# Patient Record
Sex: MALE | Race: WHITE
[De-identification: names, ages, dates, MRNs, and addresses within clinical notes are randomized per-mention and may not be internally consistent; named-entity substitution may affect disease eponyms.]

---

## 2018-01-31 ENCOUNTER — HOSPITAL ENCOUNTER (OUTPATIENT)
Dept: HOSPITAL 41 - JD.ED | Age: 23
Discharge: HOME | End: 2018-01-31
Attending: SURGERY
Payer: COMMERCIAL

## 2018-01-31 DIAGNOSIS — K42.0: Primary | ICD-10-CM

## 2018-01-31 DIAGNOSIS — L02.216: ICD-10-CM

## 2018-01-31 DIAGNOSIS — Z90.89: ICD-10-CM

## 2018-01-31 PROCEDURE — 96360 HYDRATION IV INFUSION INIT: CPT

## 2018-01-31 PROCEDURE — 74177 CT ABD & PELVIS W/CONTRAST: CPT

## 2018-01-31 PROCEDURE — 87205 SMEAR GRAM STAIN: CPT

## 2018-01-31 PROCEDURE — 86140 C-REACTIVE PROTEIN: CPT

## 2018-01-31 PROCEDURE — 85025 COMPLETE CBC W/AUTO DIFF WBC: CPT

## 2018-01-31 PROCEDURE — 96361 HYDRATE IV INFUSION ADD-ON: CPT

## 2018-01-31 PROCEDURE — 10060 I&D ABSCESS SIMPLE/SINGLE: CPT

## 2018-01-31 PROCEDURE — 82150 ASSAY OF AMYLASE: CPT

## 2018-01-31 PROCEDURE — 99285 EMERGENCY DEPT VISIT HI MDM: CPT

## 2018-01-31 PROCEDURE — 49585: CPT

## 2018-01-31 PROCEDURE — 87075 CULTR BACTERIA EXCEPT BLOOD: CPT

## 2018-01-31 PROCEDURE — 36415 COLL VENOUS BLD VENIPUNCTURE: CPT

## 2018-01-31 PROCEDURE — 80053 COMPREHEN METABOLIC PANEL: CPT

## 2018-01-31 PROCEDURE — 83605 ASSAY OF LACTIC ACID: CPT

## 2018-01-31 RX ADMIN — Medication PRN ML: at 10:29

## 2018-01-31 RX ADMIN — Medication PRN ML: at 11:17

## 2018-01-31 NOTE — PCM.POSTAN
POST ANESTHESIA ASSESSMENT





- MENTAL STATUS


Mental Status: Alert, Oriented





- VITAL SIGNS


Pulse Rate: 102


SaO2: 98


Resp Rate: 17


Blood Pressure: 120/79


Temperature: 36.9 C





- RESPIRATORY


Respiratory Status: Respiratory Rate WNL, Airway Patent, O2 Saturation Stable, 

Supplemental Oxygen





- CARDIOVASCULAR


CV Status: Pulse Rate WNL, Blood Pressure Stable





- GASTROINTESTINAL


GI Status: No Symptoms





- PAIN


Pain Score: 0





- POST OP HYDRATION


Hydration Status: Adequate & Stable

## 2018-01-31 NOTE — PCM.HP
H&P History of Present Illness





- General


Date of Service: 01/31/18


Admit Problem/Dx: 


 Admission Diagnosis/Problem





Admission Diagnosis/Problem      Hernia repair








Source of Information: Patient


History Limitations: Reports: No Limitations





- History of Present Illness


Initial Comments - Free Text/Narative: 





23-year-old male presents with a 2-3 day history of persistent umbilical pain. 

The pain was worse with activity and increasingly uncomfortable. He presented 

to the emergency room earlier this morning for evaluation. A CT scan of his 

abdomen was performed and confirmed the presence of an incarcerated umbilical 

hernia containing fat. I was asked to see him for surgical consultation. He 

last ate around 5:30 this morning.


  ** Middle Abdomen


Pain Score (Numeric/FACES): 5





- Related Data


Allergies/Adverse Reactions: 


 Allergies











Allergy/AdvReac Type Severity Reaction Status Date / Time


 


No Known Allergies Allergy   Verified 01/31/18 09:17











Home Medications: 


 Home Meds





. [No Known Home Meds]  01/31/18 [History]











Past Medical History





- Past Health History


Medical/Surgical History: Denies Medical/Surgical History





- Past Surgical History


HEENT Surgical History: Reports: Tonsillectomy





Social & Family History





- Tobacco Use


Smoking Status *Q: Never Smoker





- Living Situation & Occupation


Living situation: Reports: Single


Occupation: Employed (Currently working as an   apprentice.)





H&P Review of Systems





- Review of Systems:


Review Of Systems: See Below





Exam





- Exam


Exam: See Below





- Vital Signs


Vital Signs: 


 Last Vital Signs











Temp  36.9 C   01/31/18 09:20


 


Pulse  78   01/31/18 09:20


 


Resp  18   01/31/18 09:20


 


BP  137/77   01/31/18 09:20


 


Pulse Ox  100   01/31/18 09:20











Weight: 115.666 kg





- Exam


General: Alert, Oriented, Cooperative


HEENT: EOMI, Hearing Intact


Neck: Supple, Trachea Midline


Lungs: Normal Respiratory Effort


Cardiovascular: Regular Rate, Regular Rhythm


GI/Abdominal Exam: Hernia (Umbilical erythema and tenderness to palpation with 

a palpable indurated herniated portion of fat)


 (Male) Exam: Deferred


Rectal (Males) Exam: Deferred


Extremities: Normal Inspection


Neuro Extensive - Mental Status: Alert, Oriented x3


Psychiatric: Alert, Normal Affect





- Patient Data


Lab Results Last 24 hrs: 


 Laboratory Results - last 24 hr











  01/31/18 01/31/18 01/31/18 Range/Units





  10:26 10:26 10:26 


 


WBC  7.70    (4.23-9.07)  K/mm3


 


RBC  5.99    (4.63-6.08)  M/mm3


 


Hgb  16.6    (13.7-17.5)  gm/L


 


Hct  48.8    (40.1-51.0)  %


 


MCV  81.5    (79.0-92.2)  fl


 


MCH  27.7    (25.7-32.2)  pg


 


MCHC  34.0    (32.2-35.5)  g/dl


 


RDW Std Deviation  39.9    (35.1-43.9)  fL


 


Plt Count  260    (163-337)  K/mm3


 


MPV  9.5    (9.4-12.3)  fl


 


Neutrophils % (Manual)  55    (40-60)  %


 


Band Neutrophils %  0    (0-10)  %


 


Lymphocytes % (Manual)  40    (20-40)  %


 


Atypical Lymphs %  0    %


 


Monocytes % (Manual)  4    (2-10)  %


 


Eosinophils % (Manual)  1    (0.8-7.0)  %


 


Basophils % (Manual)  0 L    (0.2-1.2)  


 


Platelet Estimate  Adequate    


 


RBC Morph Comment  Normal    


 


Sodium   140   (136-145)  mEq/L


 


Potassium   4.3   (3.5-5.1)  mEq/L


 


Chloride   103   ()  mEq/L


 


Carbon Dioxide   28   (21-32)  mEq/L


 


Anion Gap   13.3   (5-15)  


 


BUN   22 H   (7-18)  mg/dL


 


Creatinine   1.0   (0.7-1.3)  mg/dL


 


Est Cr Clr Drug Dosing   120.27   mL/min


 


Estimated GFR (MDRD)   > 60   (>60)  mL/min


 


BUN/Creatinine Ratio   22.0 H   (14-18)  


 


Glucose   98   ()  mg/dL


 


Lactic Acid    1.3  (0.4-2.0)  mmol/L


 


Calcium   9.4   (8.5-10.1)  mg/dL


 


Total Bilirubin   0.6   (0.2-1.0)  mg/dL


 


AST   22   (15-37)  U/L


 


ALT   49   (16-63)  U/L


 


Alkaline Phosphatase   57   ()  U/L


 


C-Reactive Protein   < 0.2   (<1.0)  mg/dL


 


Total Protein   8.0   (6.4-8.2)  g/dl


 


Albumin   4.4   (3.4-5.0)  g/dl


 


Globulin   3.6   gm/dL


 


Albumin/Globulin Ratio   1.2   (1-2)  


 


Amylase   42   ()  U/L











Result Diagrams: 


 01/31/18 10:26





 01/31/18 10:26





*Q Meaningful Use (ADM)





- VTE *Q


VTE Criteria *Q: 








- Stroke *Q


Stroke Criteria *Q: 








- AMI *Q


AMI Criteria *Q: 








- Problem List


(1) Incarcerated umbilical hernia


SNOMED Code(s): 884726556


   ICD Code: K42.0 - UMBILICAL HERNIA WITH OBSTRUCTION, WITHOUT GANGRENE   

Status: Acute   Priority: High   Current Visit: Yes   Onset Date: ~01/28/18   


Problem List Initiated/Reviewed/Updated: Yes


Orders Last 24hrs: 


 Active Orders 24 hr











 Category Date Time Status


 


 Admission Status [Patient Status] [ADT] Routine ADT  01/31/18 12:16 Active


 


 Patient to Empty Bladder [RC] ASDIRECTED Care  01/31/18 12:19 Ordered


 


 Verify Patient Consent Obtain [RC] ASDIRECTED Care  01/31/18 12:19 Ordered


 


 Nothing Per Oral Diet [DIET] Diet  01/31/18 Lunch Ordered


 


 Sodium Chloride 0.9% @ 125 MLS/HR (1000ml) Med  01/31/18 12:30 Ordered





 Sodium Chloride 0.9% [Normal Saline] 1,000 ml   





 IV ASDIRECTED   


 


 Sodium Chloride 0.9% [Normal Saline] 1,000 ml Med  01/31/18 10:00 Active





 IV ASDIRECTED   


 


 Sodium Chloride 0.9% [Saline Flush] Med  01/31/18 09:56 Active





 10 ml FLUSH ONETIME PRN   


 


 cefOXitin [Mefoxin in Dextrose,Iso-Osm 1 GM/50 ML] 1 gm Med  01/31/18 12:19 

Ordered





 Premix Bag 1 bag   





 IV ONETIME   


 


 Schedule Procedure [COMM] Stat Oth  01/31/18 12:16 Ordered


 


 Resuscitation Status Routine Resus Stat  01/31/18 12:19 Ordered








 Medication Orders





Sodium Chloride (Normal Saline)  1,000 mls @ 125 mls/hr IV ASDIRECTED VAN


   Last Admin: 01/31/18 10:21  Dose: 125 mls/hr


Cefoxitin Sodium 1 gm/ Premix  50 mls @ 100 mls/hr IV ONETIME ONE


   Stop: 01/31/18 12:48


Sodium Chloride (Normal Saline)  1,000 mls @ 125 mls/hr IV ASDIRECTED [facility]


Sodium Chloride (Saline Flush)  10 ml FLUSH ONETIME PRN


   PRN Reason: IV FLUSH


   Last Admin: 01/31/18 11:17  Dose: 10 ml


   Admin: 01/31/18 10:29  Dose: 10 ml








Assessment/Plan Comment:: 





Incarcerated umbilical hernia with ischemia. He needs open reduction and repair 

as soon as possible.





I explained to the patient that it was highly unlikely that mesh would be 

needed. We also discussed bleeding infection and recurrence. He was happy to 

proceed after having all of his questions answered.

## 2018-01-31 NOTE — CT
CT abdomen and pelvis

 

Technique: Multiple axial sections were obtained from above the dome 

of the diaphragm inferiorly through the pubic symphysis.  Intravenous 

and oral contrast was given.

 

Comparison: Prior abdominal x-ray of 06/23/12.

 

Findings: Small portion of the visualized lung bases are clear.  Liver

 shows no focal parenchymal abnormality.  Spleen appears within normal

 limits.  Adrenal glands show no nodule.  Pancreas is within normal 

limits.  Gallbladder contains no calcified gallstones.  Kidneys show 

no hydronephrosis or mass.  Aorta shows no aneurysmal dilatation.  

Gallbladder contains no calcified gallstones.  No retroperitoneal 

adenopathy or mesenteric abnormalities are seen.  

 

Small low-density abnormality is noted at the base of the umbilicus.  

Uncertain as to etiology but difficult to exclude a small abscess or 

hematoma.  There is a small fat-containing umbilical hernia also being

 seen.  This low-density finding measures about 1.8 cm.

 

No mesenteric abnormalities are seen.  No pelvic mass or adenopathy is

 noted.  No free fluid or inflammatory change is seen within the 

abdomen or within the pelvis.

 

Bone window settings were reviewed which appears within normal limits 

for the patient's age.

 

Impression:

1.  Small fat-containing umbilical hernia.  Low-density abnormality at

 the base of the umbilicus which is of uncertain etiology but 

difficult to exclude abscess if patient has any infectious symptoms.  

Umbilical hematoma is also within the differential.

2.  No additional abnormality is seen on CT study of the abdomen and 

pelvis.

 

Diagnostic code #3

## 2018-01-31 NOTE — EDM.PDOC
ED HPI GENERAL MEDICAL PROBLEM





- General


Chief Complaint: Abdominal Pain


Stated Complaint: HERNIA SENT BY [facility]


Time Seen by Provider: 01/31/18 09:45


Source of Information: Reports: Patient


History Limitations: Reports: No Limitations





- History of Present Illness


INITIAL COMMENTS - FREE TEXT/NARRATIVE: 





23-year-old male presents presents to the ED with persistent abdominal pain 

that started 3 days ago. States is getting worse. He was seen to the walk-in 

clinic at [facility] last evening and an ultrasound of his umbilicus was 

performed. Addended identified tissue incarcerated within an umbilical hernia. 

 is no previous abdominal surgeries. He was thus sent to the ED for 

further evaluation of possible incarcerated tissue in the hernia. He has no 

nausea or vomiting. States him putting this morning only. Bowel function has 

been normal without any blood. He is otherwise in good health.


Onset: Gradual


Onset Date: 01/29/18


Duration: Hour(s):


Location: Reports: Abdomen (Umbilicus superior aspect)


Quality: Reports: Ache, Sharp, Stabbing, Other


Severity: Moderate (Worsens when he stands fully erect. Worsens with coughing.)


Improves with: Reports: Rest


Worsens with: Reports: Movement (Even at rest he still aware of discomfort at 

the umbilicus.)


Context: Denies: Activity, Exercise ( Especially stretching or standing fully 

erect or coughing.), Lifting, Sick Contact, Trauma, Other


Associated Symptoms: Denies: No Other Symptoms, Chest Pain, Cough, cough w 

sputum, Diaphoresis, Fever/Chills, Headaches, Loss of Appetite, Malaise, Nausea/

Vomiting, Rash, Seizure, Shortness of Breath, Syncope


Treatments PTA: Reports: Other (see below)


  ** Middle Abdomen


Pain Score (Numeric/FACES): 5





- Related Data


 Allergies











Allergy/AdvReac Type Severity Reaction Status Date / Time


 


No Known Allergies Allergy   Verified 01/31/18 09:17











Home Meds: 


 Home Meds





. [No Known Home Meds]  01/31/18 [History]











Past Medical History





- Past Health History


Medical/Surgical History: Denies Medical/Surgical History





- Past Surgical History


HEENT Surgical History: Reports: Tonsillectomy





Social & Family History





- Tobacco Use


Smoking Status *Q: Never Smoker





- Living Situation & Occupation


Living situation: Reports: Single


Occupation: Employed (Currently working as an   apprentice.)





ED ROS GENERAL





- Review of Systems


Review Of Systems: See Below


Constitutional: Denies: Fever, Chills, Malaise, Weakness, Night Sweats, 

Diaphoresis, Decreased Appetite, Weight Loss, Weight Gain


HEENT: Reports: No Symptoms


Respiratory: Reports: No Symptoms


Cardiovascular: Reports: No Symptoms


Endocrine: Reports: No Symptoms


GI/Abdominal: Reports: Abdominal Pain (See history of present illness.).  Denies

: Bloody Stool, Hematemesis, Hematochezia, Nausea, Stool Incontinence, Vomiting


: Reports: No Symptoms


Musculoskeletal: Reports: No Symptoms


Skin: Reports: No Symptoms


Neurological: Reports: No Symptoms


Psychiatric: Reports: No Symptoms


Hematologic/Lymphatic: Reports: No Symptoms


Immunologic: Reports: No Symptoms





ED EXAM, GI/ABD





- Physical Exam


Exam: See Below


Exam Limited By: No Limitations


General Appearance: Alert, WD/WN, No Apparent Distress


Eyes: Bilateral: Normal Appearance


Head: Atraumatic, Normocephalic


Neck: Normal Inspection, Supple, Non-Tender, Full Range of Motion.  No: 

Lymphadenopathy (L), Lymphadenopathy (R)


Respiratory/Chest: No Respiratory Distress, Lungs Clear, Normal Breath Sounds


Cardiovascular: Normal Peripheral Pulses, Regular Rate, Rhythm, No Edema, No 

Murmur


GI/Abdominal Exam: Normal Bowel Sounds, Soft, Tender (Tenderness to the 

superior aspect of the umbilicus. No palpable mass but painful to cough.)


 (Male) Exam: Hernia (Appears to have a umbilical hernia superiorly.)


Back Exam: Normal Inspection, Full Range of Motion.  No: CVA Tenderness (L), 

CVA Tenderness (R)


Extremities: Normal Inspection, Normal Range of Motion, Non-Tender, No Pedal 

Edema


Neurological: Alert, Oriented, CN II-XII Intact, Normal Cognition


Psychiatric: Normal Affect, Normal Mood


Skin Exam: Warm, Dry, Intact, Normal Color





Course





- Vital Signs


Last Recorded V/S: 


 Last Vital Signs











Temp  36.9 C   01/31/18 09:20


 


Pulse  78   01/31/18 09:20


 


Resp  18   01/31/18 09:20


 


BP  137/77   01/31/18 09:20


 


Pulse Ox  100   01/31/18 09:20














- Orders/Labs/Meds


Orders: 


 Active Orders 24 hr











 Category Date Time Status


 


 Admission Status [Patient Status] [ADT] Routine ADT  01/31/18 12:16 Active


 


 Patient to Empty Bladder [RC] ASDIRECTED Care  01/31/18 12:19 Active


 


 Verify Patient Consent Obtain [RC] ASDIRECTED Care  01/31/18 12:19 Active


 


 Nothing Per Oral Diet [DIET] Diet  01/31/18 Lunch Active


 


 Sodium Chloride 0.9% [Normal Saline] 1,000 ml Med  01/31/18 10:00 Active





 IV ASDIRECTED   


 


 Sodium Chloride 0.9% [Normal Saline] 1,000 ml Med  01/31/18 12:30 Active





 IV ASDIRECTED   


 


 Sodium Chloride 0.9% [Saline Flush] Med  01/31/18 09:56 Active





 10 ml FLUSH ONETIME PRN   


 


 cefOXitin [Mefoxin in Dextrose,Iso-Osm 1 GM/50 ML] 1 gm Med  01/31/18 12:19 

Active





 Premix Bag 1 bag   





 IV ONETIME   


 


 Schedule Procedure [COMM] Stat Oth  01/31/18 12:16 Ordered


 


 Resuscitation Status Routine Resus Stat  01/31/18 12:19 Ordered








 Medication Orders





Sodium Chloride (Normal Saline)  1,000 mls @ 125 mls/hr IV ASDIRECTED VAN


   Last Admin: 01/31/18 10:21  Dose: 125 mls/hr


Cefoxitin Sodium 1 gm/ Premix  50 mls @ 100 mls/hr IV ONETIME ONE


   Stop: 01/31/18 12:48


Sodium Chloride (Normal Saline)  1,000 mls @ 125 mls/hr IV ASDIRECTED VAN


Sodium Chloride (Saline Flush)  10 ml FLUSH ONETIME PRN


   PRN Reason: IV FLUSH


   Last Admin: 01/31/18 11:17  Dose: 10 ml


   Admin: 01/31/18 10:29  Dose: 10 ml








Labs: 


 Laboratory Tests











  01/31/18 01/31/18 01/31/18 Range/Units





  10:26 10:26 10:26 


 


WBC  7.70    (4.23-9.07)  K/mm3


 


RBC  5.99    (4.63-6.08)  M/mm3


 


Hgb  16.6    (13.7-17.5)  gm/L


 


Hct  48.8    (40.1-51.0)  %


 


MCV  81.5    (79.0-92.2)  fl


 


MCH  27.7    (25.7-32.2)  pg


 


MCHC  34.0    (32.2-35.5)  g/dl


 


RDW Std Deviation  39.9    (35.1-43.9)  fL


 


Plt Count  260    (163-337)  K/mm3


 


MPV  9.5    (9.4-12.3)  fl


 


Neutrophils % (Manual)  55    (40-60)  %


 


Band Neutrophils %  0    (0-10)  %


 


Lymphocytes % (Manual)  40    (20-40)  %


 


Atypical Lymphs %  0    %


 


Monocytes % (Manual)  4    (2-10)  %


 


Eosinophils % (Manual)  1    (0.8-7.0)  %


 


Basophils % (Manual)  0 L    (0.2-1.2)  


 


Platelet Estimate  Adequate    


 


RBC Morph Comment  Normal    


 


Sodium   140   (136-145)  mEq/L


 


Potassium   4.3   (3.5-5.1)  mEq/L


 


Chloride   103   ()  mEq/L


 


Carbon Dioxide   28   (21-32)  mEq/L


 


Anion Gap   13.3   (5-15)  


 


BUN   22 H   (7-18)  mg/dL


 


Creatinine   1.0   (0.7-1.3)  mg/dL


 


Est Cr Clr Drug Dosing   120.27   mL/min


 


Estimated GFR (MDRD)   > 60   (>60)  mL/min


 


BUN/Creatinine Ratio   22.0 H   (14-18)  


 


Glucose   98   ()  mg/dL


 


Lactic Acid    1.3  (0.4-2.0)  mmol/L


 


Calcium   9.4   (8.5-10.1)  mg/dL


 


Total Bilirubin   0.6   (0.2-1.0)  mg/dL


 


AST   22   (15-37)  U/L


 


ALT   49   (16-63)  U/L


 


Alkaline Phosphatase   57   ()  U/L


 


C-Reactive Protein   < 0.2   (<1.0)  mg/dL


 


Total Protein   8.0   (6.4-8.2)  g/dl


 


Albumin   4.4   (3.4-5.0)  g/dl


 


Globulin   3.6   gm/dL


 


Albumin/Globulin Ratio   1.2   (1-2)  


 


Amylase   42   ()  U/L











Meds: 


Medications











Generic Name Dose Route Start Last Admin





  Trade Name Freq  PRN Reason Stop Dose Admin


 


Sodium Chloride  1,000 mls @ 125 mls/hr  01/31/18 10:00  01/31/18 10:21





  Normal Saline  IV   125 mls/hr





  ASDIRECTED VAN   Administration


 


Cefoxitin Sodium 1 gm/ Premix  50 mls @ 100 mls/hr  01/31/18 12:19  





  IV  01/31/18 12:48  





  ONETIME ONE   


 


Sodium Chloride  1,000 mls @ 125 mls/hr  01/31/18 12:30  





  Normal Saline  IV   





  ASDIRECTED VAN   


 


Sodium Chloride  10 ml  01/31/18 09:56  01/31/18 11:17





  Saline Flush  FLUSH   10 ml





  ONETIME PRN   Administration





  IV FLUSH   














Discontinued Medications














Generic Name Dose Route Start Last Admin





  Trade Name Jose  PRN Reason Stop Dose Admin


 


Diatrizoate Meglum/Diatrizoate Sod  90 ml  01/31/18 09:56  01/31/18 11:17





  Gastrografin 37%  PO  01/31/18 09:57  90 ml





  ONETIME ONE   Administration


 


Iopamidol  150 ml  01/31/18 09:56  01/31/18 11:17





  Isovue-300 (61%)  IVPUSH  01/31/18 09:57  125 ml





  ONETIME ONE   Administration














- Radiology Interpretation


Free Text/Narrative:: 





23-year-old male presents to the ED with persistent umbilical pain for the last 

2 and half days. Hurts to cough hurts to stretch in certain movements cause 

pain. He had ultrasound done to the [facility] walk-in side last night and 

it revealed tissue incarcerated within a umbilical hernia. He sent to the ED 

for further evaluation this morning as to whether not this could be bowel 

within his abdominal wall. He is very tender in the superior aspect of his 

umbilicus. No large masses appreciated. His appetite is good and his bowel 

function is normal. Offered him pain medicine he felt he did not need any at 

this time. Plan will be CT of the abdomen and pelvis with oral and IV contrast 

to identify what may be incarcerated in umbilical hernia. Routine preoperative 

labs done just in case he needs to go to the OR.





- Re-Assessments/Exams


Free Text/Narrative Re-Assessment/Exam: 





01/31/18 11:45: CT of the abdomen can reveals a fatty incarcerated umbilical 

hernia with surrounding inflammatory fluid. This suggests necrosis of fatty 

tissue. This would be the reason that the pain is actually worsening and set of 

improving. I will therefore call surgeon Dr. Gentile whom is on call. Patient 

is willing to have surgery if deemed necessary. Dr. Gentile is currently in 

surgery but will see the patient once he is out of the OR.





01/31/18 12:14 Dr. Gentile came by and will see the patient in the the ED in 

consultation.White count is 7.70 with a 55% neutrophils and 0% band cells. 

Hemoglobin is 16.6 with hematocrit of 40.8. Chemistry is all normal. BUN is 

slightly elevated at 22. Glucose is 98. Lactic acid is 1.3. Amylase is 42. C-

reactive protein is less than 0.2. Liver function is normal.





01/31/18 12:26 Dr. Gentile plans on taking him to the OR shortly.








Departure





- Departure


Time of Disposition: 12:25


Disposition: DC/Tfer to [facility]


Condition: Fair


Clinical Impression: 


 Incarcerated umbilical hernia








- Discharge Information


Referrals: 


PCP,None [Primary Care Provider] - 


Forms:  ED Department Discharge





- My Orders


Last 24 Hours: 


My Active Orders





01/31/18 09:56


Sodium Chloride 0.9% [Saline Flush]   10 ml FLUSH ONETIME PRN 





01/31/18 10:00


Sodium Chloride 0.9% [Normal Saline] 1,000 ml IV ASDIRECTED 














- Assessment/Plan


Last 24 Hours: 


My Active Orders





01/31/18 09:56


Sodium Chloride 0.9% [Saline Flush]   10 ml FLUSH ONETIME PRN 





01/31/18 10:00


Sodium Chloride 0.9% [Normal Saline] 1,000 ml IV ASDIRECTED

## 2018-01-31 NOTE — PCM.OPNOTE
- General Post-Op/Procedure Note


Date of Surgery/Procedure: 01/31/18


Operative Procedure(s): 1. Incision and drainage of a sebaceous umbilical cyst.

  2. Umbilical hernia repair primary closure


Findings: 





1. 3 mL of yellow pus emanating from a skin cyst at the base of the umbilical 

skin.


2. 8mm defect containing herniated preperitoneal fat which was not ischemic or 

compromised in any way.


Pre Op Diagnosis: Incarcerated umbilical hernia


Post-Op Diagnosis: 1. Cutaneous umbilical abscess.  2. Small umbilical hernia


Anesthesia Technique: General ET Tube, Local


Primary Surgeon: Wilfrido Gentile


Pathology: 





Cultures


EBL in mLs: 2


Complications: None


Condition: Good


Free Text/Narrative:: 





After adequate general endotracheal tube anesthesia was obtained the patient's 

abdomen was prepped and draped for an open umbilical hernia repair. During the 

prep process several cc of fabiola yellow pus flowed from the base of the 

umbilicus. Aerobic and anaerobic cultures were performed at this point. Local 

analgesia was given just above the umbilicus. A 15 blade was used to make a 

small curvilinear incision. The umbilical hernia was dissected away from the 

surrounding subcutaneous tissue with scissors. I  the umbilical skin 

away from the very small umbilical hernia. The rest of the pus was aspirated. I 

irrigated out the entire field with 500 mL of saline. I cauterized the base of 

the umbilical skin then closed the opening with a 3-0 Vicryl. The small 

umbilical herniation which was most likely an incidental finding was reduced 

and I closed the very small defect with a figure-of-eight 0 Prolene. The 

umbilicus was attached to the midline fascia with a 3-0 Vicryl figure-of-eight. 

A quarter-inch Penrose drain was placed from the base of the umbilical skin and 

secured to the abdominal skin with a 2-0 Prolene. 3-0 Vicryl was used to 

reapproximate the umbilicus. Staples were used to close the skin. Gauze and 

tape were used for the dressing. There were no complications.

## 2018-01-31 NOTE — PCM.PREANE
Preanesthetic Assessment





- Anesthesia/Transfusion/Family Hx


Anesthesia History: Prior Anesthesia Without Reaction


Family History of Anesthesia Reaction: No


Transfusion History: No Prior Transfusion(s)





- Review of Systems


General: No Symptoms


Pulmonary: No Symptoms


Cardiovascular: No Symptoms


Gastrointestinal: Abdominal Pain


Neurological: No Symptoms


Other: Reports: None





- Physical Assessment


NPO Status Date: 01/31/18


NPO Status Time: 05:45


O2 Sat by Pulse Oximetry: 100


Respiratory Rate: 18


Vital Signs: 





 Last Vital Signs











Temp  36.9 C   01/31/18 09:20


 


Pulse  78   01/31/18 09:20


 


Resp  18   01/31/18 09:20


 


BP  137/77   01/31/18 09:20


 


Pulse Ox  100   01/31/18 09:20











Height: 1.8 m


Weight: 115.666 kg


ASA Class: 1


Mental Status: Alert & Oriented x3


Airway Class: Mallampati = 1


Dentition: Reports: Normal Dentition


Thyro-Mental Finger Breadths: 3


Mouth Opening Finger Breadths: 3


ROM/Head Extension: Full


Lungs: Clear to Auscultation, Normal Respiratory Effort


Cardiovascular: Regular Rate, Regular Rhythm





- Lab


Values: 





 Laboratory Last Values











WBC  7.70 K/mm3 (4.23-9.07)   01/31/18  10:26    


 


RBC  5.99 M/mm3 (4.63-6.08)   01/31/18  10:26    


 


Hgb  16.6 gm/L (13.7-17.5)   01/31/18  10:26    


 


Hct  48.8 % (40.1-51.0)   01/31/18  10:26    


 


MCV  81.5 fl (79.0-92.2)   01/31/18  10:26    


 


MCH  27.7 pg (25.7-32.2)   01/31/18  10:26    


 


MCHC  34.0 g/dl (32.2-35.5)   01/31/18  10:26    


 


RDW Std Deviation  39.9 fL (35.1-43.9)   01/31/18  10:26    


 


Plt Count  260 K/mm3 (163-337)   01/31/18  10:26    


 


MPV  9.5 fl (9.4-12.3)   01/31/18  10:26    


 


Neutrophils % (Manual)  55 % (40-60)   01/31/18  10:26    


 


Band Neutrophils %  0 % (0-10)   01/31/18  10:26    


 


Lymphocytes % (Manual)  40 % (20-40)   01/31/18  10:26    


 


Atypical Lymphs %  0 %  01/31/18  10:26    


 


Monocytes % (Manual)  4 % (2-10)   01/31/18  10:26    


 


Eosinophils % (Manual)  1 % (0.8-7.0)   01/31/18  10:26    


 


Basophils % (Manual)  0  (0.2-1.2)  L  01/31/18  10:26    


 


Platelet Estimate  Adequate   01/31/18  10:26    


 


RBC Morph Comment  Normal   01/31/18  10:26    


 


Sodium  140 mEq/L (136-145)   01/31/18  10:26    


 


Potassium  4.3 mEq/L (3.5-5.1)   01/31/18  10:26    


 


Chloride  103 mEq/L ()   01/31/18  10:26    


 


Carbon Dioxide  28 mEq/L (21-32)   01/31/18  10:26    


 


Anion Gap  13.3  (5-15)   01/31/18  10:26    


 


BUN  22 mg/dL (7-18)  H  01/31/18  10:26    


 


Creatinine  1.0 mg/dL (0.7-1.3)   01/31/18  10:26    


 


Est Cr Clr Drug Dosing  120.27 mL/min  01/31/18  10:26    


 


Estimated GFR (MDRD)  > 60 mL/min (>60)   01/31/18  10:26    


 


BUN/Creatinine Ratio  22.0  (14-18)  H  01/31/18  10:26    


 


Glucose  98 mg/dL ()   01/31/18  10:26    


 


Lactic Acid  1.3 mmol/L (0.4-2.0)   01/31/18  10:26    


 


Calcium  9.4 mg/dL (8.5-10.1)   01/31/18  10:26    


 


Total Bilirubin  0.6 mg/dL (0.2-1.0)   01/31/18  10:26    


 


AST  22 U/L (15-37)   01/31/18  10:26    


 


ALT  49 U/L (16-63)   01/31/18  10:26    


 


Alkaline Phosphatase  57 U/L ()   01/31/18  10:26    


 


C-Reactive Protein  < 0.2 mg/dL (<1.0)   01/31/18  10:26    


 


Total Protein  8.0 g/dl (6.4-8.2)   01/31/18  10:26    


 


Albumin  4.4 g/dl (3.4-5.0)   01/31/18  10:26    


 


Globulin  3.6 gm/dL  01/31/18  10:26    


 


Albumin/Globulin Ratio  1.2  (1-2)   01/31/18  10:26    


 


Amylase  42 U/L ()   01/31/18  10:26    














- Allergies


Allergies/Adverse Reactions: 


 Allergies











Allergy/AdvReac Type Severity Reaction Status Date / Time


 


No Known Allergies Allergy   Verified 01/31/18 09:17














- Acknowledgements


Anesthesia Type Planned: General Anesthesia


Pt an Appropriate Candidate for the Planned Anesthesia: Yes


Alternatives and Risks of Anesthesia Discussed w Pt/Guardian: Yes


Pt/Guardian Understands and Agrees with Anesthesia Plan: Yes





PreAnesthesia Questionnaire





- Past Health History


Medical/Surgical History: Denies Medical/Surgical History





- Past Surgical History


HEENT Surgical History: Reports: Tonsillectomy





- SUBSTANCE USE


Smoking Status *Q: Never Smoker





- HOME MEDS


Home Medications: 


 Home Meds





. [No Known Home Meds]  01/31/18 [History]











- CURRENT (IN HOUSE) MEDS


Current Meds: 





 Current Medications





Sodium Chloride (Normal Saline)  1,000 mls @ 125 mls/hr IV ASDIRECTED [facility]


   Last Admin: 01/31/18 10:21 Dose:  125 mls/hr


Sodium Chloride (Normal Saline)  1,000 mls @ 125 mls/hr IV ASDIRECTED [facility]


Sodium Chloride (Saline Flush)  10 ml FLUSH ONETIME PRN


   PRN Reason: IV FLUSH


   Last Admin: 01/31/18 11:17 Dose:  10 ml





Discontinued Medications





Diatrizoate Meglum/Diatrizoate Sod (Gastrografin 37%)  90 ml PO ONETIME ONE


   Stop: 01/31/18 09:57


   Last Admin: 01/31/18 11:17 Dose:  90 ml


Cefoxitin Sodium 1 gm/ Premix  50 mls @ 100 mls/hr IV ONETIME ONE


   Stop: 01/31/18 12:48


   Last Admin: 01/31/18 12:40 Dose:  100 mls/hr


Iopamidol (Isovue-300 (61%))  150 ml IVPUSH ONETIME ONE


   Stop: 01/31/18 09:57


   Last Admin: 01/31/18 11:17 Dose:  125 ml

## 2018-03-11 ENCOUNTER — HOSPITAL ENCOUNTER (EMERGENCY)
Dept: HOSPITAL 41 - JD.ED | Age: 23
LOS: 1 days | Discharge: HOME | End: 2018-03-12
Payer: COMMERCIAL

## 2018-03-11 DIAGNOSIS — R07.89: Primary | ICD-10-CM

## 2018-03-11 DIAGNOSIS — Z87.891: ICD-10-CM

## 2018-03-11 PROCEDURE — 99285 EMERGENCY DEPT VISIT HI MDM: CPT

## 2018-03-11 PROCEDURE — 71046 X-RAY EXAM CHEST 2 VIEWS: CPT

## 2018-03-11 PROCEDURE — 85025 COMPLETE CBC W/AUTO DIFF WBC: CPT

## 2018-03-11 PROCEDURE — 82553 CREATINE MB FRACTION: CPT

## 2018-03-11 PROCEDURE — 86140 C-REACTIVE PROTEIN: CPT

## 2018-03-11 PROCEDURE — 36415 COLL VENOUS BLD VENIPUNCTURE: CPT

## 2018-03-11 PROCEDURE — 85379 FIBRIN DEGRADATION QUANT: CPT

## 2018-03-11 PROCEDURE — 84484 ASSAY OF TROPONIN QUANT: CPT

## 2018-03-11 PROCEDURE — 93005 ELECTROCARDIOGRAM TRACING: CPT

## 2018-03-11 PROCEDURE — 80053 COMPREHEN METABOLIC PANEL: CPT

## 2018-03-11 NOTE — EDM.PDOC
ED HPI GENERAL MEDICAL PROBLEM





- General


Chief Complaint: Chest Pain


Stated Complaint: CHEST PAIN


Time Seen by Provider: 03/11/18 23:04


Source of Information: Reports: Patient


History Limitations: Reports: No Limitations





- History of Present Illness


INITIAL COMMENTS - FREE TEXT/NARRATIVE: 


23-year-old male presents to the ED after developing transient left upper 

anterior chest pain which was fairly sharp and stabbing and radiated into his 

left axilla. States it lasted less than 15 seconds. However after a period of 

time it became apparent that he's been expressing similar type pains for the 

better part of 10 days. No associated fever cough shortness of breath. No 

travel history that would put him at risk of DVT. No history of previous DVT he 

states he has not been working out with weights. He does work heavily in the 

oil field industry lifting pushing pulling but has no reported injury to his 

chest wall. At the time my examination he seems quite tired. He is quite 

anxious as is his wife that he may have underlying heart disease since it runs 

in his family. He denies any fever or chills. No upper abdominal pain. Has been 

eating and drinking normal with no odynophagia. Is not prone to reflux disease 

per se. Tonight he took Aleve and feels that this helped relieve the pain. He 

has been undergoing frequent chiropractic manipulations over the last several 

weeks due to lots of aches and pains including his neck back and lower back and 

SI joints. He did have rib heads adjusted last friday. 





Onset: Sudden


Onset Date: 03/11/18


Onset Time: 22:00


Duration: Hour(s):, Intermittent (Has been sitting having similar type pains 

off and on for the last 1012 days. They are fleeting and come and go. ), Waxing/

Waning


Location: Reports: Chest (Left anterior upper chest midclavicular line rating 

towards the left axilla)


Quality: Reports: Sharp, Stabbing, Other (Pains are fleeting.)


Severity: Moderate


Improves with: Reports: None


Worsens with: Reports: None


Context: Reports: Other (To spontaneous occurrence of chest pains.).  Denies: 

Activity, Exercise, Lifting, Sick Contact, Trauma


Associated Symptoms: Reports: Chest Pain, Malaise, Other (Lots of aches and 

pains.).  Denies: No Other Symptoms, Confusion, Cough, cough w sputum (See 

history of present illness), Diaphoresis, Fever/Chills, Headaches, Loss of 

Appetite, Nausea/Vomiting, Rash, Seizure, Shortness of Breath, Syncope, Weakness


Treatments PTA: Reports: NSAIDS


  ** Chest


Pain Score (Numeric/FACES): 0





- Related Data


 Allergies











Allergy/AdvReac Type Severity Reaction Status Date / Time


 


No Known Allergies Allergy   Verified 01/31/18 09:17











Home Meds: 


 Home Meds





Famotidine IV [Pepcid IV] 20 mg IV Q12HR #10 sdv 03/12/18 [Rx]











Past Medical History





- Past Health History


Medical/Surgical History: Denies Medical/Surgical History


Musculoskeletal History: Reports: Other (See Below)


Other Musculoskeletal History: recent back injury





- Past Surgical History


HEENT Surgical History: Reports: Tonsillectomy


GI Surgical History: Reports: Other (See Below)


Other GI Surgeries/Procedures: umbilical hernia surgery Jan 31, 2018





Social & Family History





- Tobacco Use


Smoking Status *Q: Former Smoker


Used Tobacco, but Quit: Yes


Month Tobacco Last Used: 2 y rs





- Caffeine Use


Caffeine Use: Reports: Soda





- Recreational Drug Use


Recreational Drug Use: No





- Living Situation & Occupation


Living situation: Reports: Single


Occupation: Employed (Currently working as an   apprentice.)





ED ROS GENERAL





- Review of Systems


Review Of Systems: See Below


Constitutional: Reports: Malaise, Weakness, Fatigue, Decreased Appetite.  Denies

: Fever, Chills, Weight Loss


HEENT: Reports: No Symptoms.  Denies: Hearing Loss, Nosebleed, Sinus Problem


Respiratory: Reports: Pleuritic Chest Pain (Pleuritic-like chest pain left 

upper anterior chest).  Denies: Shortness of Breath, Wheezing, Cough, Hemoptysis


Cardiovascular: Reports: Chest Pain.  Denies: Blood Pressure Problem (See 

history present illness), Claudication, Dyspnea on Exertion, Edema, 

Lightheadedness, Orthopnea, Palpitations, PND, Syncope, Other


Endocrine: Reports: Fatigue


GI/Abdominal: Reports: Decreased Appetite


: Reports: No Symptoms


Musculoskeletal: Reports: Neck Pain, Back Pain, Joint Pain, Other (Currently 

has lots of aches and pains. Having his neck rib heads low back and SI joints 

adjusted by chiropractor once or twice weekly last few weeks.)


Skin: Reports: No Symptoms


Neurological: Reports: No Symptoms (SI joints.)


Psychiatric: Reports: No Symptoms


Hematologic/Lymphatic: Reports: No Symptoms


Immunologic: Reports: No Symptoms





ED EXAM, GENERAL





- Physical Exam


Exam: See Below


Exam Limited By: No Limitations


General Appearance: Alert, WD/WN, Other (Appears tired.)


Eye Exam: Bilateral Eye: Normal Inspection


Throat/Mouth: Normal Inspection, Normal Lips, Normal Teeth, Normal Oropharynx, 

Normal Voice, Other


Head: Atraumatic, Normocephalic (No signs of infection)


Neck: Normal Inspection, Supple, Non-Tender, Full Range of Motion.  No: Carotid 

Bruit, Lymphadenopathy (L), Lymphadenopathy (R), Thyromegaly


Respiratory/Chest: Normal Breath Sounds, No Accessory Muscle Use, Chest Non-

Tender, Respiratory Distress (Mild tachypnea at rest 20/m. O2 sats 99% on room 

air.), Other (I could not elucidate any chest pain tenderness on palpation of 

the ribs or pectoralis major muscle. There was no)


Cardiovascular: Normal Peripheral Pulses, Regular Rate, Rhythm, No Edema, No 

Gallop, No Murmur


Peripheral Pulses: 3+: Carotid (L), Carotid (R), Posterior Tibial (L), 

Posterior Tibial (R), Dorsalis Pedis (L), Dorsalis Pedis (R)


GI/Abdominal: Normal Bowel Sounds, Soft, Non-Tender, No Organomegaly, No 

Abnormal Bruit, No Mass, Pelvis Stable


Back Exam: Normal Inspection, Decreased Range of Motion.  No: CVA Tenderness (L)

, CVA Tenderness (R)


Extremities: Normal Inspection, Normal Range of Motion, Non-Tender, No Pedal 

Edema


Neurological: Alert, Oriented, CN II-XII Intact (Stiff and sore in his lower 

back.), Normal Cognition, Normal Gait, No Motor/Sensory Deficits


Psychiatric: Normal Affect, Other (Appears very tired.)


Skin Exam: Warm, Dry, Intact, Normal Color, No Rash





EKG INTERPRETATION


EKG Date: 03/11/18


Time: 22:47


Rhythm: Other


Rate (Beats/Min): 93


Axis: LAD-Left Axis Deviation (Left axis deviation of -52. Left anterior 

fascicular block pattern.)


P-Wave: Present


QRS: Other (Incomplete right bundle branch block. There is early R-wave 

transition)


ST-T: Other (No signs of ischemia. There is a diffuse early repolarization 

pattern. T-wave flattening in lead 3.)


QT: Normal





Course





- Vital Signs


Last Recorded V/S: 


 Last Vital Signs











Temp  36.8 C   03/11/18 22:35


 


Pulse  87   03/11/18 22:35


 


Resp  20   03/11/18 22:35


 


BP  150/82 H  03/11/18 22:35


 


Pulse Ox  99   03/11/18 22:35














- Orders/Labs/Meds


Orders: 


 Active Orders 24 hr











 Category Date Time Status


 


 EKG Documentation Completion [RC] ASDIRECTED Care  03/11/18 22:47 Active


 


 Chest 2V [CR] Stat Exams  03/11/18 23:04 Taken


 


 EKG 12 Lead [EK] Stat Ther  03/11/18 22:47 Ordered











Labs: 


 Laboratory Tests











  03/12/18 03/12/18 03/12/18 Range/Units





  00:54 00:54 00:54 


 


WBC  8.96    (4.23-9.07)  K/mm3


 


RBC  5.40    (4.63-6.08)  M/mm3


 


Hgb  15.0    (13.7-17.5)  gm/L


 


Hct  44.4    (40.1-51.0)  %


 


MCV  82.2    (79.0-92.2)  fl


 


MCH  27.8    (25.7-32.2)  pg


 


MCHC  33.8    (32.2-35.5)  g/dl


 


RDW Std Deviation  40.1    (35.1-43.9)  fL


 


Plt Count  233    (163-337)  K/mm3


 


MPV  9.4    (9.4-12.3)  fl


 


Neutrophils % (Manual)  66 H    (40-60)  %


 


Band Neutrophils %  0    (0-10)  %


 


Lymphocytes % (Manual)  28    (20-40)  %


 


Atypical Lymphs %  1    %


 


Monocytes % (Manual)  4    (2-10)  %


 


Eosinophils % (Manual)  0 L    (0.8-7.0)  %


 


Basophils % (Manual)  1    (0.2-1.2)  


 


Platelet Estimate  Adequate    


 


Plt Morphology Comment  Normal    


 


RBC Morph Comment  Normal    


 


D-Dimer, Quantitative   < 0.19 L   (0.19-0.59)  mg/L


 


Sodium    142  (136-145)  mEq/L


 


Potassium    3.9  (3.5-5.1)  mEq/L


 


Chloride    106  ()  mEq/L


 


Carbon Dioxide    25  (21-32)  mEq/L


 


Anion Gap    14.9  (5-15)  


 


BUN    19 H  (7-18)  mg/dL


 


Creatinine    0.9  (0.7-1.3)  mg/dL


 


Est Cr Clr Drug Dosing    133.63  mL/min


 


Estimated GFR (MDRD)    > 60  (>60)  mL/min


 


BUN/Creatinine Ratio    21.1 H  (14-18)  


 


Glucose    111 H  ()  mg/dL


 


Calcium    8.7  (8.5-10.1)  mg/dL


 


Total Bilirubin    0.5  (0.2-1.0)  mg/dL


 


AST    16  (15-37)  U/L


 


ALT    35  (16-63)  U/L


 


Alkaline Phosphatase    72  ()  U/L


 


CK-MB (CK-2)    1.1  (0-3.6)  ng/ml


 


Troponin I    < 0.017  (0.00-0.056)  ng/mL


 


C-Reactive Protein    < 0.2  (<1.0)  mg/dL


 


Total Protein    6.9  (6.4-8.2)  g/dl


 


Albumin    4.1  (3.4-5.0)  g/dl


 


Globulin    2.8  gm/dL


 


Albumin/Globulin Ratio    1.5  (1-2)  











Meds: 


Medications














Discontinued Medications














Generic Name Dose Route Start Last Admin





  Trade Name Freq  PRN Reason Stop Dose Admin


 


Famotidine  20 mg  03/12/18 01:40  03/12/18 01:59





  Pepcid  PO  03/12/18 01:41  20 mg





  ONETIME ONE   Administration


 


Ondansetron HCl  4 mg  03/12/18 01:41  03/12/18 01:59





  Zofran Odt  PO  03/12/18 01:42  4 mg





  ONETIME ONE   Administration


 


Pantoprazole Sodium  40 mg  03/12/18 01:42  03/12/18 01:59





  Protonix***  PO  03/12/18 01:43  40 mg





  ONETIME ONE   Administration














- Radiology Interpretation


Free Text/Narrative:: 


23-year-old male attends the ED with a history of fleeting or transient left 

upper anterior chest wall pain. Pain wish fairly sharp and stabbing and 

radiated towards the left axilla. States it lasted less than 15 seconds. States 

then it leaves him with a dull aching discomfort. For this he took Aleve about 

an hour later it seemed to be gone. By history is been expressing similar type 

pains for the better part of 1012 days. No associated cough or sputum 

production. No fever chills or night sweats. She came to make sure that it was 

not his heart. He has no history of previous DVT and no history that would 

suggest involvement of DVT. Examination I could not elicit any chest wall pain 

could very 2 to both lung fields with no adventitial sounds appreciated. Heart 

was sinus with no murmurs. ECG done by triage nurse shows no signs of ischemia. 

He does have early R-wave transition and incomplete right heart block. Plan two-

view chest x-ray to be done. If this is negative will pursue labs








- Re-Assessments/Exams


Free Text/Narrative Re-Assessment/Exam: 





03/12/18 01:29:  two-view chest x-ray is within normal limits.





03/12/18: White count is 8.96 with 66% neutrophils and no bands. Hemoglobin is 

15.0 with hematocrit of 44.4. Platelet count is normal at 233,000. D-dimer was 

less than 0.19. Sodium 142 potassium 3.9 chloride 16 bicarbonate 25. Anion gap 

is 14.9. BUNs 19. Creatinine normal at 0.9. GFR greater than 60. Glucose is 

111. Calcium normal at 8.7. Bilirubin 0.5. AST 16 ALT 35. Alkaline phosphatase 

is normal at 72. CK-MB fraction 1.1C troponin I is less than 0.017. C-reactive 

protein less than 0.2. Patient so advised of the findings with normal labs. 

There is no sign of cardiac or lung involvement in his chest pain. Chest pain 

is chest wall in origin or neurogenic. It is fleeting sharp and stabbing and 

spastic in nature.











Departure





- Departure


Time of Disposition: 01:55


Disposition: Home, Self-Care 01


Condition: Fair


Clinical Impression: 


 Non-cardiac chest pain, Anterior chest wall pain





Prescriptions: 


Famotidine IV [Pepcid IV] 20 mg IV Q12HR #10 sdv


Instructions:  Gastritis, Adult, Easy-to-Read


Referrals: 


PCP,Not In Area [Primary Care Provider] - 


Forms:  ED Department Discharge, ED Return to Work/School Form


Additional Instructions: 


Evaluation the emergent tonight in regards to development of left upper 

anterior chest pain which was sharp and stabbing and fleeting rating towards 

the left armpit. By history this is been happening intermittently for the last 

week or perhaps even 10 days. No associated cough fever chills or recent upper 

respiratory tract infections. Chest x-ray two-view did not reveal any signs of 

inflammation of the ribs or the lung lining or the lung itself. Heart shadow is 

within normal limits. Echocardiogram did not show any signs of heart attack or 

what we call ischemia. Lab work was done and it reveals normal cardiac markers 

with no signs of heart involvement in your chest wall pain over the last week. 

A d-dimer was also negative which looks for blood clots in the lungs and it 

also looks back over the last several days or week. Arcus for inflammation also 

proved to be negative. Therefore chest pain is of benign cause. It does not 

appear to be appear to be heart or lung related appears to be coming from chest 

wall with spasm of muscles in between the ribs or inflammation of the nerve 

that travels along the bottom of the rib from the spine. It is therefore 

conservative as this tends to go away with time but can often last 3-5 weeks 

when the cause is viral such as from coxsackievirus or echovirus. It may or may 

not be related to recent manipulation of rib heads in your upper back by the 

chiropractor. Treatment is similar to what you did tonight in terms of use of 

anti-inflammatory on an as-needed basis such as Motrin 600 mg or Aleve 2 

tablets every 8 hours if needed.





- My Orders


Last 24 Hours: 


My Active Orders





03/11/18 22:47


EKG Documentation Completion [RC] ASDIRECTED 


EKG 12 Lead [EK] Stat 





03/11/18 23:04


Chest 2V [CR] Stat 














- Assessment/Plan


Last 24 Hours: 


My Active Orders





03/11/18 22:47


EKG Documentation Completion [RC] ASDIRECTED 


EKG 12 Lead [EK] Stat 





03/11/18 23:04


Chest 2V [CR] Stat

## 2018-03-12 NOTE — CR
Chest:  Two views of the chest were obtained.

 

Comparison: No prior chest x-ray.

 

Heart size and mediastinum are normal.  Lungs are clear.  Bony 

structures appear within normal limits for the patient's age.

 

Impression:

1.  Nothing acute is seen on two-view chest x-ray.

 

Diagnostic code #1